# Patient Record
Sex: MALE | Race: WHITE | NOT HISPANIC OR LATINO | Employment: FULL TIME | URBAN - METROPOLITAN AREA
[De-identification: names, ages, dates, MRNs, and addresses within clinical notes are randomized per-mention and may not be internally consistent; named-entity substitution may affect disease eponyms.]

---

## 2021-08-09 ENCOUNTER — OFFICE VISIT (OUTPATIENT)
Dept: URGENT CARE | Facility: CLINIC | Age: 56
End: 2021-08-09
Payer: COMMERCIAL

## 2021-08-09 VITALS
BODY MASS INDEX: 26.55 KG/M2 | HEIGHT: 72 IN | HEART RATE: 72 BPM | SYSTOLIC BLOOD PRESSURE: 163 MMHG | WEIGHT: 196 LBS | TEMPERATURE: 98.9 F | OXYGEN SATURATION: 98 % | RESPIRATION RATE: 18 BRPM | DIASTOLIC BLOOD PRESSURE: 79 MMHG

## 2021-08-09 DIAGNOSIS — Z23 NEED FOR TETANUS BOOSTER: ICD-10-CM

## 2021-08-09 DIAGNOSIS — T63.481A INSECT STINGS, ACCIDENTAL OR UNINTENTIONAL, INITIAL ENCOUNTER: Primary | ICD-10-CM

## 2021-08-09 PROCEDURE — 99203 OFFICE O/P NEW LOW 30 MIN: CPT | Performed by: EMERGENCY MEDICINE

## 2021-08-09 PROCEDURE — 90471 IMMUNIZATION ADMIN: CPT | Performed by: EMERGENCY MEDICINE

## 2021-08-09 PROCEDURE — 90715 TDAP VACCINE 7 YRS/> IM: CPT | Performed by: EMERGENCY MEDICINE

## 2021-08-09 RX ORDER — DIPHENOXYLATE HYDROCHLORIDE AND ATROPINE SULFATE 2.5; .025 MG/1; MG/1
1 TABLET ORAL DAILY
COMMUNITY

## 2021-08-09 RX ORDER — PREDNISONE 10 MG/1
TABLET ORAL
Qty: 27 TABLET | Refills: 0 | Status: SHIPPED | OUTPATIENT
Start: 2021-08-09

## 2021-08-09 RX ORDER — ACETAMINOPHEN 500 MG
500 TABLET ORAL
COMMUNITY

## 2021-08-09 RX ORDER — TADALAFIL 5 MG/1
TABLET ORAL
COMMUNITY
Start: 2021-06-02

## 2021-08-09 NOTE — PATIENT INSTRUCTIONS
Use ice pack or cold compresses to avoid scratching  Take an H1 antihistamine like Benadryl or non-sedating antihistamine like Zyrtec, Allegra or Claritin, and an H2 antihistamine like Pepcid or Zantac for itch  If you are diabetic you should adhere strictly to your diabetic diet and monitor blood sugar closely while on prednisone and you should discontinue the prednisone if blood sugar becomes significantly elevated  Avoid nonsteroidal anti-inflammatories like Advil or Aleve while on prednisone  Insect Bite or Sting   AMBULATORY CARE:   Most insect bites and stings  are not dangerous and go away without treatment  Common examples of insects that bite or sting are bees, ticks, mosquitoes, spiders, and ants  Insect bites or stings can lead to diseases such as malaria, West Nile virus, Lyme disease, or [place] Spotted Fever  Common signs and symptoms:   · Mild symptoms  include a red bump, pain, swelling, and itching  · Anaphylaxis symptoms  include throat tightness, trouble breathing, tingling, dizziness, and wheezing  Anaphylaxis is a sudden, life-threatening reaction that needs immediate treatment  Call your local emergency number (911 in the [address] [place] [address],[address]) for signs or symptoms of anaphylaxis,  such as trouble breathing, swelling in your mouth or throat, or wheezing  You may also have itching, a rash, hives, or feel like you are going to faint  Seek care immediately if:   · You are stung on your tongue or in your throat  · A white area forms around the bite  · You are sweating badly or have body pain  · You think you were bitten or stung by a poisonous insect  Call your doctor if:   · You have a fever  · The area becomes red, warm, tender, and swollen beyond the area of the bite or sting  · You have questions or concerns about your condition or care  Steps to take for signs or symptoms of anaphylaxis:   · Immediately  give 1 shot of epinephrine only into the outer thigh muscle  · Leave the shot in place  as directed  Your healthcare provider may recommend you leave it in place for up to 10 seconds before you remove it  This helps make sure all of the epinephrine is delivered  · Call 911 and go to the emergency department,  even if the shot improved symptoms  Do not drive yourself  Bring the used epinephrine shot with you  Treatment  depends on how severe your symptoms are and if you had anaphylaxis before  You may need any of the following:  · Antihistamines  decrease mild symptoms such as itching and rash  · Epinephrine  is medicine used to treat severe allergic reactions such as anaphylaxis  · A tetanus shot  may be given  The shot is a vaccine that helps prevent a bacterial infection  Tetanus booster shots are usually given every 10 years  If an insect bites or stings you:   · Remove the stinger  Scrape the stinger out with your fingernail, edge of a credit card, or a knife blade  Do not squeeze the wound  Gently wash the area with soap and water  · Remove the tick  Ticks must be removed as soon as possible so you do not get diseases passed through tick bites  Ask your healthcare provider for more information on tick bites and how to remove ticks  Care for your bite or sting wound:   · Elevate (raise) the area above the level of your heart, if possible  Prop the area on pillows to keep it raised comfortably  Elevate the area for 10 to 20 minutes each hour or as directed by your healthcare provider  · Apply compresses  Soak a clean washcloth in cold water, wring it out, and put it on the bite or sting  Use the compress for 10 to 20 minutes each hour or as directed by your healthcare provider  After 24 to 48 hours, change to warm compresses  · Apply a baking soda paste  Add water to baking soda to make a thick paste  Put the paste on the area for 5 minutes  Rinse gently to remove the paste      Safety precautions to take if you are at risk for anaphylaxis:   · Keep 2 shots of epinephrine with you at all times  You may need a second shot, because epinephrine only works for about 20 minutes and symptoms may return  Your healthcare provider can show you and family members how to give the shot  Check the expiration date every month and replace it before it expires  · Create an action plan  Your healthcare provider can help you create a written plan that explains the allergy and an emergency plan to treat a reaction  The plan explains when to give a second epinephrine shot if symptoms return or do not improve after the first  Give copies of the action plan and emergency instructions to family members, work and school staff, and  providers  Show them how to give a shot of epinephrine  · Carry medical alert identification  Wear medical alert jewelry or carry a card that says you have an insect allergy  Ask your healthcare provider where to get these items  Prevent an insect bite or sting:   · Do not wear bright-colored or flower-print clothing when you plan to spend time outdoors  Do not use hairspray, perfumes, or aftershave  · Do not leave food out  · Empty any standing water  Wash containers with soap and water every 2 days  · Put screens on all open windows and doors  · Put insect repellent that contains DEET on skin that is showing when you go outside  Put insect repellent at the top of your boots, bottom of pant legs, and sleeve cuffs  Wear long sleeves, pants, and shoes  · Use citronella candles outdoors to help keep mosquitoes away  Put a tick and flea collar on pets  Follow up with your doctor as directed:  Write down your questions so you remember to ask them during your visits  © Copyright [employer] 2021 Information is for End User's use only and may not be sold, redistributed or otherwise used for commercial purposes   All illustrations and images included in CareNotes® are the copyrighted property of A  [employer]  or [facility]  The above information is an  only  It is not intended as medical advice for individual conditions or treatments  Talk to your doctor, nurse or pharmacist before following any medical regimen to see if it is safe and effective for you

## 2021-08-09 NOTE — PROGRESS NOTES
[facility]        NAME: Caden Gerard is a 49 y o  male  : 1965    MRN: [id number]  DATE: 2021  TIME: 12:47 PM    Assessment and Plan   Insect stings, accidental or unintentional, initial encounter [T63 351H]  1  Insect stings, accidental or unintentional, initial encounter  predniSONE 10 mg tablet    Tdap Vaccine greater than or equal to 8yo   2  Need for tetanus booster  predniSONE 10 mg tablet    Tdap Vaccine greater than or equal to 8yo         Patient Instructions     Patient Instructions     Use ice pack or cold compresses to avoid scratching  Take an H1 antihistamine like Benadryl or non-sedating antihistamine like Zyrtec, Allegra or Claritin, and an H2 antihistamine like Pepcid or Zantac for itch  If you are diabetic you should adhere strictly to your diabetic diet and monitor blood sugar closely while on prednisone and you should discontinue the prednisone if blood sugar becomes significantly elevated  Avoid nonsteroidal anti-inflammatories like Advil or Aleve while on prednisone  Insect Bite or Sting   AMBULATORY CARE:   Most insect bites and stings  are not dangerous and go away without treatment  Common examples of insects that bite or sting are bees, ticks, mosquitoes, spiders, and ants  Insect bites or stings can lead to diseases such as malaria, West Nile virus, Lyme disease, or [place] Spotted Fever  Common signs and symptoms:   · Mild symptoms  include a red bump, pain, swelling, and itching  · Anaphylaxis symptoms  include throat tightness, trouble breathing, tingling, dizziness, and wheezing  Anaphylaxis is a sudden, life-threatening reaction that needs immediate treatment  Call your local emergency number (911 in the [address] [place] [address],[address]) for signs or symptoms of anaphylaxis,  such as trouble breathing, swelling in your mouth or throat, or wheezing  You may also have itching, a rash, hives, or feel like you are going to faint    Seek care immediately if:   · You are stung on your tongue or in your throat  · A white area forms around the bite  · You are sweating badly or have body pain  · You think you were bitten or stung by a poisonous insect  Call your doctor if:   · You have a fever  · The area becomes red, warm, tender, and swollen beyond the area of the bite or sting  · You have questions or concerns about your condition or care  Steps to take for signs or symptoms of anaphylaxis:   · Immediately  give 1 shot of epinephrine only into the outer thigh muscle  · Leave the shot in place  as directed  Your healthcare provider may recommend you leave it in place for up to 10 seconds before you remove it  This helps make sure all of the epinephrine is delivered  · Call 911 and go to the emergency department,  even if the shot improved symptoms  Do not drive yourself  Bring the used epinephrine shot with you  Treatment  depends on how severe your symptoms are and if you had anaphylaxis before  You may need any of the following:  · Antihistamines  decrease mild symptoms such as itching and rash  · Epinephrine  is medicine used to treat severe allergic reactions such as anaphylaxis  · A tetanus shot  may be given  The shot is a vaccine that helps prevent a bacterial infection  Tetanus booster shots are usually given every 10 years  If an insect bites or stings you:   · Remove the stinger  Scrape the stinger out with your fingernail, edge of a credit card, or a knife blade  Do not squeeze the wound  Gently wash the area with soap and water  · Remove the tick  Ticks must be removed as soon as possible so you do not get diseases passed through tick bites  Ask your healthcare provider for more information on tick bites and how to remove ticks  Care for your bite or sting wound:   · Elevate (raise) the area above the level of your heart, if possible  Prop the area on pillows to keep it raised comfortably   Elevate the area for 10 to 20 minutes each hour or as directed by your healthcare provider  · Apply compresses  Soak a clean washcloth in cold water, wring it out, and put it on the bite or sting  Use the compress for 10 to 20 minutes each hour or as directed by your healthcare provider  After 24 to 48 hours, change to warm compresses  · Apply a baking soda paste  Add water to baking soda to make a thick paste  Put the paste on the area for 5 minutes  Rinse gently to remove the paste  Safety precautions to take if you are at risk for anaphylaxis:   · Keep 2 shots of epinephrine with you at all times  You may need a second shot, because epinephrine only works for about 20 minutes and symptoms may return  Your healthcare provider can show you and family members how to give the shot  Check the expiration date every month and replace it before it expires  · Create an action plan  Your healthcare provider can help you create a written plan that explains the allergy and an emergency plan to treat a reaction  The plan explains when to give a second epinephrine shot if symptoms return or do not improve after the first  Give copies of the action plan and emergency instructions to family members, work and school staff, and  providers  Show them how to give a shot of epinephrine  · Carry medical alert identification  Wear medical alert jewelry or carry a card that says you have an insect allergy  Ask your healthcare provider where to get these items  Prevent an insect bite or sting:   · Do not wear bright-colored or flower-print clothing when you plan to spend time outdoors  Do not use hairspray, perfumes, or aftershave  · Do not leave food out  · Empty any standing water  Wash containers with soap and water every 2 days  · Put screens on all open windows and doors  · Put insect repellent that contains DEET on skin that is showing when you go outside   Put insect repellent at the top of your boots, bottom of pant legs, and sleeve cuffs  Wear long sleeves, pants, and shoes  · Use citronella candles outdoors to help keep mosquitoes away  Put a tick and flea collar on pets  Follow up with your doctor as directed:  Write down your questions so you remember to ask them during your visits  © Copyright [employer] 2021 Information is for End User's use only and may not be sold, redistributed or otherwise used for commercial purposes  All illustrations and images included in CareNotes® are the copyrighted property of A D A M , Inc  or [facility]   The above information is an  only  It is not intended as medical advice for individual conditions or treatments  Talk to your doctor, nurse or pharmacist before following any medical regimen to see if it is safe and effective for you  Follow up with PCP in 3-5 days  Proceed to  ER if symptoms worsen  Chief Complaint     Chief Complaint   Patient presents with    Insect Bite     A couple bee stings on right hand and left side of ribs          History of Present Illness       Patient complains of swelling to right hand with mild pain and pruritus and also redness and mild swelling left lateral abdomen since bee stings to same yesterday  He denies any throat or respiratory symptoms  He denies history of bee sting allergy  He has taken Benadryl without relief  Review of Systems   Review of Systems   Constitutional: Negative for chills and fever  Musculoskeletal: Negative for arthralgias, joint swelling, myalgias and neck stiffness  Skin: Positive for color change  Negative for rash           Current Medications       Current Outpatient Medications:     acetaminophen (TYLENOL) 500 mg tablet, Take 500 mg by mouth, Disp: , Rfl:     multivitamin (THERAGRAN) TABS, Take 1 tablet by mouth daily, Disp: , Rfl:     predniSONE 10 mg tablet, Take once daily all days pills on this schedule 6- 6- 5- 4- 3- 2- 1, Disp: 27 tablet, Rfl: 0   tadalafil (CIALIS) 5 MG tablet, TAKE 1 TABLET BY MOUTH DAILY EVERY DAY FOR ERECTILE DYSFUNCTION  , Disp: , Rfl:     Current Allergies     Allergies as of 2021    (No Known Allergies)            The following portions of the patient's history were reviewed and updated as appropriate: allergies, current medications, past family history, past medical history, past social history, past surgical history and problem list      Past Medical History:   Diagnosis Date    No known problems        Past Surgical History:   Procedure Laterality Date    HERNIA REPAIR W/  SECTION         Family History   Problem Relation Age of Onset    Hypertension Father          Medications have been verified  Objective   /79   Pulse 72   Temp 98 9 °F (37 2 °C)   Resp 18   Ht 6' (1 829 m)   Wt 88 9 kg (196 lb)   SpO2 98%   BMI 26 58 kg/m²        Physical Exam     Physical Exam  Vitals and nursing note reviewed  Constitutional:       General: He is not in acute distress  Appearance: He is well-developed  Musculoskeletal:         General: No tenderness  Cervical back: Neck supple  Skin:     General: Skin is warm and dry  Findings: Erythema present  No rash  Comments: Erythema right hand dorsum with moderate swelling but without increased warmth  Erythema with mild swelling left lateral abdomen without increased warmth  Puncture wounds at each site without foreign bodies  Neurological:      Mental Status: He is alert and oriented to person, place, and time  Psychiatric:         Mood and Affect: Mood normal          Behavior: Behavior normal          Thought Content:  Thought content normal          Judgment: Judgment normal

## 2025-05-14 ENCOUNTER — OFFICE VISIT (OUTPATIENT)
Dept: URGENT CARE | Facility: CLINIC | Age: 60
End: 2025-05-14
Payer: COMMERCIAL

## 2025-05-14 ENCOUNTER — APPOINTMENT (OUTPATIENT)
Dept: RADIOLOGY | Facility: CLINIC | Age: 60
End: 2025-05-14
Attending: PHYSICIAN ASSISTANT
Payer: COMMERCIAL

## 2025-05-14 VITALS
WEIGHT: 175 LBS | OXYGEN SATURATION: 99 % | HEART RATE: 71 BPM | TEMPERATURE: 97.5 F | BODY MASS INDEX: 24.5 KG/M2 | DIASTOLIC BLOOD PRESSURE: 88 MMHG | HEIGHT: 71 IN | RESPIRATION RATE: 18 BRPM | SYSTOLIC BLOOD PRESSURE: 132 MMHG

## 2025-05-14 DIAGNOSIS — R07.81 RIB PAIN: ICD-10-CM

## 2025-05-14 DIAGNOSIS — S20.212A RIB CONTUSION, LEFT, INITIAL ENCOUNTER: Primary | ICD-10-CM

## 2025-05-14 DIAGNOSIS — S20.211A RIB CONTUSION, RIGHT, INITIAL ENCOUNTER: ICD-10-CM

## 2025-05-14 PROCEDURE — 71111 X-RAY EXAM RIBS/CHEST4/> VWS: CPT

## 2025-05-14 PROCEDURE — S9083 URGENT CARE CENTER GLOBAL: HCPCS | Performed by: PHYSICIAN ASSISTANT

## 2025-05-14 PROCEDURE — 71100 X-RAY EXAM RIBS UNI 2 VIEWS: CPT

## 2025-05-14 PROCEDURE — G0382 LEV 3 HOSP TYPE B ED VISIT: HCPCS | Performed by: PHYSICIAN ASSISTANT

## 2025-05-14 PROCEDURE — 71101 X-RAY EXAM UNILAT RIBS/CHEST: CPT

## 2025-05-14 RX ORDER — LIDOCAINE 50 MG/G
1 PATCH TOPICAL DAILY
Qty: 15 PATCH | Refills: 0 | Status: SHIPPED | OUTPATIENT
Start: 2025-05-14

## 2025-05-14 RX ORDER — METHOCARBAMOL 500 MG/1
500 TABLET, FILM COATED ORAL
Qty: 15 TABLET | Refills: 0 | Status: SHIPPED | OUTPATIENT
Start: 2025-05-14

## 2025-05-14 RX ORDER — IBUPROFEN 600 MG/1
600 TABLET, FILM COATED ORAL EVERY 8 HOURS PRN
Qty: 30 TABLET | Refills: 0 | Status: SHIPPED | OUTPATIENT
Start: 2025-05-14

## 2025-05-14 NOTE — PROGRESS NOTES
"[facility]        NAME: Braden Alexis is a 59 y.o. male  : 1965    MRN: [id number]  DATE: May 14, 2025  TIME: 2:11 PM    /88   Pulse 71   Temp 97.5 °F (36.4 °C) (Tympanic)   Resp 18   Ht 5' 11\" (1.803 m)   Wt 79.4 kg (175 lb)   SpO2 99%   BMI 24.41 kg/m²     Assessment and Plan   Rib contusion, left, initial encounter [S20.212A]  1. Rib contusion, left, initial encounter  XR ribs right w pa chest min 3 views    ibuprofen (MOTRIN) 600 mg tablet    methocarbamol (ROBAXIN) 500 mg tablet    lidocaine (Lidoderm) 5 %    CANCELED: XR ribs left w pa chest min 3 views      2. Rib contusion, right, initial encounter  ibuprofen (MOTRIN) 600 mg tablet    methocarbamol (ROBAXIN) 500 mg tablet    lidocaine (Lidoderm) 5 %            Patient Instructions       Follow up with PCP in 3-5 days.  Proceed to  ER if symptoms worsen.    Chief Complaint     Chief Complaint   Patient presents with    Rib Pain     B/L rib pain with painful breathing since Saturday following a bear hug         History of Present Illness       Pt with bilat lower rib pain since getting a bear hug and lifted off ground x 5 days         Review of Systems   Review of Systems   Constitutional: Negative.    HENT: Negative.     Eyes: Negative.    Respiratory: Negative.     Cardiovascular: Negative.    Gastrointestinal: Negative.    Endocrine: Negative.    Genitourinary: Negative.    Musculoskeletal: Negative.    Skin: Negative.    Allergic/Immunologic: Negative.    Neurological: Negative.    Hematological: Negative.    Psychiatric/Behavioral: Negative.     All other systems reviewed and are negative.        Current Medications     Current Medications[1]    Current Allergies     Allergies as of 2025    (No Known Allergies)            The following portions of the patient's history were reviewed and updated as appropriate: allergies, current medications, past family history, past medical history, past social history, past " "surgical history and problem list.     Past Medical History:   Diagnosis Date    No known problems        Past Surgical History:   Procedure Laterality Date    HERNIA REPAIR W/  SECTION         Family History   Problem Relation Age of Onset    Hypertension Father          Medications have been verified.        Objective   /88   Pulse 71   Temp 97.5 °F (36.4 °C) (Tympanic)   Resp 18   Ht 5' 11\" (1.803 m)   Wt 79.4 kg (175 lb)   SpO2 99%   BMI 24.41 kg/m²        Physical Exam     Physical Exam  Vitals and nursing note reviewed.   Constitutional:       Appearance: Normal appearance. He is normal weight.   HENT:      Head: Normocephalic and atraumatic.     Cardiovascular:      Rate and Rhythm: Normal rate and regular rhythm.      Pulses: Normal pulses.      Heart sounds: Normal heart sounds.   Pulmonary:      Effort: Pulmonary effort is normal.      Breath sounds: Normal breath sounds.      Comments: Bilat lower ribs  mid axillary line   No ruq no luq pain no cva pain no sternum pain   Abdominal:      General: Abdomen is flat.      Palpations: Abdomen is soft.     Musculoskeletal:         General: Normal range of motion.      Cervical back: Normal range of motion and neck supple.     Skin:     General: Skin is warm.     Neurological:      Mental Status: He is alert and oriented to person, place, and time.     Psychiatric:         Behavior: Behavior normal.                          [1]   Current Outpatient Medications:     esomeprazole (NexIUM) 20 mg capsule, Take 20 mg by mouth every morning before breakfast, Disp: , Rfl:     ibuprofen (MOTRIN) 600 mg tablet, Take 1 tablet (600 mg total) by mouth every 8 (eight) hours as needed for mild pain, Disp: 30 tablet, Rfl: 0    lidocaine (Lidoderm) 5 %, Apply 1 patch topically daily over 12 hours Remove & Discard patch within 12 hours or as directed by MD, Disp: 15 patch, Rfl: 0    methocarbamol (ROBAXIN) 500 mg tablet, Take 1 tablet (500 mg total) by mouth " daily at bedtime, Disp: 15 tablet, Rfl: 0    multivitamin (THERAGRAN) TABS, Take 1 tablet by mouth in the morning., Disp: , Rfl:     tadalafil (CIALIS) 5 MG tablet, , Disp: , Rfl:     acetaminophen (TYLENOL) 500 mg tablet, Take 500 mg by mouth (Patient not taking: Reported on 5/14/2025), Disp: , Rfl:     predniSONE 10 mg tablet, Take once daily all days pills on this schedule 6- 6- 5- 4- 3- 2- 1 (Patient not taking: Reported on 5/14/2025), Disp: 27 tablet, Rfl: 0